# Patient Record
Sex: FEMALE | Race: WHITE | NOT HISPANIC OR LATINO
[De-identification: names, ages, dates, MRNs, and addresses within clinical notes are randomized per-mention and may not be internally consistent; named-entity substitution may affect disease eponyms.]

---

## 2018-06-15 PROBLEM — Z00.00 ENCOUNTER FOR PREVENTIVE HEALTH EXAMINATION: Status: ACTIVE | Noted: 2018-06-15

## 2018-07-09 ENCOUNTER — APPOINTMENT (OUTPATIENT)
Dept: POPULATION HEALTH | Facility: CLINIC | Age: 53
End: 2018-07-09
Payer: COMMERCIAL

## 2018-07-09 VITALS
RESPIRATION RATE: 15 BRPM | HEART RATE: 66 BPM | OXYGEN SATURATION: 96 % | SYSTOLIC BLOOD PRESSURE: 126 MMHG | DIASTOLIC BLOOD PRESSURE: 76 MMHG | WEIGHT: 114 LBS

## 2018-07-09 DIAGNOSIS — Z80.3 FAMILY HISTORY OF MALIGNANT NEOPLASM OF BREAST: ICD-10-CM

## 2018-07-09 DIAGNOSIS — Z83.79 FAMILY HISTORY OF OTHER DISEASES OF THE DIGESTIVE SYSTEM: ICD-10-CM

## 2018-07-09 DIAGNOSIS — Z83.518 FAMILY HISTORY OF OTHER SPECIFIED EYE DISORDER: ICD-10-CM

## 2018-07-09 DIAGNOSIS — Z78.9 OTHER SPECIFIED HEALTH STATUS: ICD-10-CM

## 2018-07-09 DIAGNOSIS — G43.909 MIGRAINE, UNSPECIFIED, NOT INTRACTABLE, W/OUT STATUS MIGRAINOSUS: ICD-10-CM

## 2018-07-09 DIAGNOSIS — Z82.61 FAMILY HISTORY OF ARTHRITIS: ICD-10-CM

## 2018-07-09 DIAGNOSIS — K21.9 GASTRO-ESOPHAGEAL REFLUX DISEASE W/OUT ESOPHAGITIS: ICD-10-CM

## 2018-07-09 DIAGNOSIS — Z82.69 FAMILY HISTORY OF OTHER DISEASES OF THE MUSCULOSKELETAL SYSTEM AND CONNECTIVE TISSUE: ICD-10-CM

## 2018-07-09 DIAGNOSIS — J45.20 MILD INTERMITTENT ASTHMA, UNCOMPLICATED: ICD-10-CM

## 2018-07-09 PROCEDURE — 99202 OFFICE O/P NEW SF 15 MIN: CPT

## 2018-07-09 RX ORDER — IBUPROFEN 800 MG/1
TABLET, FILM COATED ORAL
Refills: 0 | Status: ACTIVE | COMMUNITY

## 2018-07-09 RX ORDER — CALCIUM CITRATE/VITAMIN D3 200MG-6.25
TABLET ORAL
Refills: 0 | Status: ACTIVE | COMMUNITY

## 2018-07-09 RX ORDER — UBIDECARENONE/VIT E ACET 100MG-5
CAPSULE ORAL
Refills: 0 | Status: ACTIVE | COMMUNITY

## 2018-07-09 RX ORDER — SUMATRIPTAN SUCCINATE 100 MG/1
TABLET, FILM COATED ORAL
Refills: 0 | Status: ACTIVE | COMMUNITY

## 2018-07-15 ENCOUNTER — TRANSCRIPTION ENCOUNTER (OUTPATIENT)
Age: 53
End: 2018-07-15

## 2018-11-05 ENCOUNTER — APPOINTMENT (OUTPATIENT)
Dept: POPULATION HEALTH | Facility: CLINIC | Age: 53
End: 2018-11-05
Payer: COMMERCIAL

## 2018-11-05 VITALS
BODY MASS INDEX: 22.78 KG/M2 | WEIGHT: 113 LBS | OXYGEN SATURATION: 99 % | HEIGHT: 59 IN | TEMPERATURE: 98 F | DIASTOLIC BLOOD PRESSURE: 62 MMHG | SYSTOLIC BLOOD PRESSURE: 116 MMHG | RESPIRATION RATE: 16 BRPM | HEART RATE: 80 BPM

## 2018-11-05 PROCEDURE — 99212 OFFICE O/P EST SF 10 MIN: CPT

## 2019-02-04 ENCOUNTER — APPOINTMENT (OUTPATIENT)
Dept: POPULATION HEALTH | Facility: CLINIC | Age: 54
End: 2019-02-04
Payer: COMMERCIAL

## 2019-02-04 VITALS
WEIGHT: 118 LBS | HEIGHT: 59 IN | TEMPERATURE: 97.8 F | RESPIRATION RATE: 16 BRPM | OXYGEN SATURATION: 97 % | DIASTOLIC BLOOD PRESSURE: 78 MMHG | HEART RATE: 91 BPM | SYSTOLIC BLOOD PRESSURE: 124 MMHG | BODY MASS INDEX: 23.79 KG/M2

## 2019-02-04 PROCEDURE — 99213 OFFICE O/P EST LOW 20 MIN: CPT

## 2019-02-05 NOTE — HISTORY OF PRESENT ILLNESS
[FreeTextEntry1] : s. patient continues with left hip pain, severe, invovling botht he medial and external areas of her hip. She has difficulty standing for long periods of time and difficulty walking as well. Reports neck pain and numbness in her hands, especially the rigth. Reports some lower back pain as well. reports numbness in her hands, especially the right, 2-3 digits, recently. continues with left leg pain (toe pain) as well. \par patient saw her pmd for the hip who reportedly recommended not to do a revision of the surgery. she was referred for a second opinion and reportedly two additional doctors stated they don't want to go ahead for a second surgery as they are afraid of the results of the surgery. patient had a mri of the lumbar spine that showed worsening spinal stenosis and was referred to a pm & r md who recommended further tests to clarify the origin of her pain. she is undergoing these tests and has to return to the pm & r doctor. all of this has resulted in severe load of stress. patient was put out of work in early january.\par patient had an evaluation for autoimmune diseases, all negative. \par db: approved for disability for one of her insurances.

## 2019-02-05 NOTE — PHYSICAL EXAM
[General Appearance - Alert] : alert [General Appearance - In No Acute Distress] : in no acute distress [General Appearance - Well Nourished] : well nourished [General Appearance - Well Developed] : well developed [FreeTextEntry1] : continues with an abnormal position. asymmetry of lower extremities. spasm of neck/paraspinal areas na of lower back. tinel is positive in both wrists at the ct area, negative at [facility]. phalen's was positive on the left hand to 2-3 fingers at some 30 sec or so.

## 2019-02-05 NOTE — ASSESSMENT
[FreeTextEntry1] : mri lumbar spine 12/19/18: posterior decompression L4-5, small left sided extrusion with caudal migration of L5 verebral dogy that mildly compresses left intrathecal L5 nerve root, moderate to severe b L4 foraminal stenosis and severe L5-S1, extensive solid osseos fusion of posterior elements. \par mri left hip 12/31/18: s/p total hip arthropalsty, minmiial synovial extension, small amoutn of lfuid decompresssing into psoas bursa, decreased in voluem compared to prior study, unchanged tendiosnis of iliopsoas and hip abuductors.\par x ray pelvis and mri c spine 1/17/19: mild thoracolumbar levocuvature, mild retrolisthesis C34-4-5, moderate multilevel dis space, mild multilevel thoracic spondilosys, some degree of thorac spinal stenosis, straigthening of lumbar lordosis with mild retrolisthesis L4-5, advanced L4-5 and L5S2 disc narroiwng, bilateral totla hip arhtoplasties, slightl pelvic tilt. \par progress note dr divya zapata 12/12/18: persistent pain after KENNETH, ilipsoas ivnovled, extremely disabled. recomends spine injection for diagnostic purposes. \par dr. taylor, ortho 1/3/19: pain post KENNETH, ilipsoas involvement. \par dr. casper, ortho, 1/7/19: does not recommend hip revision, recommends workup for spine and posssible injection, possible steroid injectino to left ilipsias\par dr. klaus jones, rehab, 1/9/19: recomends several studies including emg/ncs lower extremties, consider injection to spine, home exercse program, cervacl mri, consider ct-spect and hydro dissection of nerves if peripheral nerve entrapment is noted. \par labs [facility]: ra, uric acid, cotisol, c3, c4, cpk, lyme, c anca, p anca, esr, rpr, anti dna ds, negin direct, sjogren, crp, c peptide, anti scleroderma all normal\par a. persistent chronic pain with multiple musculoskeletal issues involving cervical spine, lumbar spine, both hips, wrists and hands and left foot. musculoskeletal conditions are definitely aggravated by excessive postural demands, awkward postures and tension that patient has to adopt in the course of her work in order to perform her work. 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 accommodations at work resulted in no improvement. patient was finally recommended to go on medical leave of absence by beginning of january in order to allow her to take care of her medical conditions.\par p. at this time i recommended my patient to stop working in her occupation and focus her energies and time in clarifying and solving her medical conditions as clearly continuing working is not only resulting in worsening her medical conditions but is adding a significant load of stress that further impairs her overall medical situation, patient understood, a letter stating temporal total disability was produced. pt will continue to be evaluated as needed. medical forms completed. counseling and support provided.

## 2019-10-23 ENCOUNTER — TRANSCRIPTION ENCOUNTER (OUTPATIENT)
Age: 54
End: 2019-10-23

## 2020-07-27 ENCOUNTER — APPOINTMENT (OUTPATIENT)
Dept: POPULATION HEALTH | Facility: CLINIC | Age: 55
End: 2020-07-27
Payer: COMMERCIAL

## 2020-07-27 VITALS
TEMPERATURE: 97.9 F | HEART RATE: 87 BPM | BODY MASS INDEX: 22.18 KG/M2 | RESPIRATION RATE: 17 BRPM | DIASTOLIC BLOOD PRESSURE: 80 MMHG | HEIGHT: 59 IN | WEIGHT: 110 LBS | SYSTOLIC BLOOD PRESSURE: 112 MMHG | OXYGEN SATURATION: 97 %

## 2020-07-27 DIAGNOSIS — M25.562 PAIN IN RIGHT KNEE: ICD-10-CM

## 2020-07-27 DIAGNOSIS — M70.72 OTHER BURSITIS OF HIP, LEFT HIP: ICD-10-CM

## 2020-07-27 DIAGNOSIS — M25.561 PAIN IN RIGHT KNEE: ICD-10-CM

## 2020-07-27 DIAGNOSIS — G89.29 LOW BACK PAIN: ICD-10-CM

## 2020-07-27 DIAGNOSIS — M41.127 ADOLESCENT IDIOPATHIC SCOLIOSIS, LUMBOSACRAL REGION: ICD-10-CM

## 2020-07-27 DIAGNOSIS — M50.90 CERVICAL DISC DISORDER, UNSPECIFIED, UNSPECIFIED CERVICAL REGION: ICD-10-CM

## 2020-07-27 DIAGNOSIS — M54.5 LOW BACK PAIN: ICD-10-CM

## 2020-07-27 DIAGNOSIS — M54.2 CERVICALGIA: ICD-10-CM

## 2020-07-27 DIAGNOSIS — G89.29 PAIN IN RIGHT KNEE: ICD-10-CM

## 2020-07-27 PROCEDURE — 99213 OFFICE O/P EST LOW 20 MIN: CPT

## 2020-07-28 RX ORDER — DICLOFENAC SODIUM 10 MG/G
1 GEL TOPICAL
Qty: 100 | Refills: 0 | Status: ACTIVE | COMMUNITY
Start: 2020-01-27

## 2020-07-28 RX ORDER — AMOXICILLIN 500 MG/1
500 TABLET, FILM COATED ORAL
Qty: 4 | Refills: 0 | Status: COMPLETED | COMMUNITY
Start: 2020-02-25

## 2020-07-28 RX ORDER — GENTAMICIN SULFATE 3 MG/ML
0.3 SOLUTION OPHTHALMIC
Qty: 5 | Refills: 0 | Status: COMPLETED | COMMUNITY
Start: 2020-02-25

## 2020-07-28 NOTE — PHYSICAL EXAM
[General Appearance - In No Acute Distress] : in no acute distress [General Appearance - Alert] : alert [General Appearance - Well Developed] : well developed [General Appearance - Well Nourished] : well nourished [FreeTextEntry1] : patient has an abnormal body position with increased kyphosis of her neck. there is asymmetry of lower extremities which results in misalignment of her hips and knees when standing. there is spasm of the neck/ paraspinal areas. there is overall good range of motion of the left hip but there is tenderness especially on hip flexion and rotation. there is tenderness to palpation especially at left hip trochanter area. there is limitation to rom of the lower back due to prior surgery with loss of lumbar lordosis. there is spasm of the paraspinal muscles of the lower back.

## 2020-07-28 NOTE — ASSESSMENT
[FreeTextEntry1] : multiple medical records reviewed, notes from dr jones 3-19 and 7-19, dr mac 3-19, 4-19, 6-19, with transforaminal injection 3-19, dr zain zapata 3-19 and 4-20 with operative report 11-19, dr hicks 6-20 with note of injections undr u/s guidance\par emg lower extremities 1-2019 is essentially normal\par a. continues with multiple musculoskeletal injuries and chronic musculoskeletal pain. conditions involve cervical spine, lumbar spine, both hips, upper extremities and left foot. most probably her conditions are multifactorial and are the consequence of cumulation of years of excessive muscle strain due to awkward positions and static positions sitting on top of multiple musculoskeletal surgeries with alteration of body mechanics. 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 given the chronicity of the conditions and the lack of response to multiple medical treatments, i think at this time that her conditions are chronic and permanent and have not significantly improved despite multiple medical treatment. her prognosis is guarded. \par p. 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 as stated, my patient cannot tolerate staying the same position for more than 20 min at a time at most as she develops pain which results in added anxiety, stress and difficulty concentrating. therefore, she does not have the ability to fulfill the requirements of a job, job demands and productivity. pt will continue to be evaluated as needed. medical forms completed. counseling and support provided. \par

## 2020-07-28 NOTE — HISTORY OF PRESENT ILLNESS
[FreeTextEntry1] : s. patient continues with multiple musculoskeletal symptoms. her most important site is the left hip where she continues with severe pain, involving both the medial and external areas of her hip, radiating down her left lower extremity. pain presents basically all the time but varies in intensity during different days and at different times of day, sometimes associated with activity, others spontaneous. patient notices pain in her lower back and at times pain in her right lower extremity as well. at times she cannot walk more than a tenth of a mile without stopping because of pain. she cannot sit for periods of over 20 min or so due to coccygeal pain and pain radiated to her left lower extremity. patient continues with neck pain as well, radiated to both upper extremities but mostly to the right, and variable as well. pain is described as dull and constant at the base of the neck and sometimes present with numbness in her hands. numbness in her left pinky finger is better. continues with left leg and toe pain as well. \par since her previous visit, patient had a second left hip surgery. this resulted in better stability of her prosthesis but did not result in improvement of her hip and thigh pain. she then underwent several procedures, including trigger point injections and other specialized procedures, non of which has resulted in alleviation of her symptoms. has had courses of physical therapy which do not fully relieve her symptoms.\par in addition, patient has been having migraine headaches.\par patient is unable to fully complete activities of daily living at home. chores like laundry, cooking and taking care of the home generally require the help of her children or her . she needs to take several breaks during the day in order to rest for short periods of time to be able to return to completing a task in which she was involved previously.\par patient continues to report significant stress and anxiety related to her overall situation. she describes that at times she has difficulty concentrating, especially when dealing with pain, and notes difficulty completing tasks and following instructions especially tasks that require involvement for periods of time. \par db: is having a review of her disability policy.

## 2021-01-25 ENCOUNTER — NON-APPOINTMENT (OUTPATIENT)
Age: 56
End: 2021-01-25

## 2021-06-29 ENCOUNTER — NON-APPOINTMENT (OUTPATIENT)
Age: 56
End: 2021-06-29

## 2021-06-29 ENCOUNTER — APPOINTMENT (OUTPATIENT)
Dept: OPHTHALMOLOGY | Facility: CLINIC | Age: 56
End: 2021-06-29
Payer: COMMERCIAL

## 2021-06-29 PROCEDURE — 92004 COMPRE OPH EXAM NEW PT 1/>: CPT

## 2021-06-29 PROCEDURE — 99072 ADDL SUPL MATRL&STAF TM PHE: CPT

## 2022-05-31 ENCOUNTER — NON-APPOINTMENT (OUTPATIENT)
Age: 57
End: 2022-05-31

## 2022-06-02 ENCOUNTER — APPOINTMENT (OUTPATIENT)
Dept: OPHTHALMOLOGY | Facility: CLINIC | Age: 57
End: 2022-06-02
Payer: MEDICARE

## 2022-06-02 ENCOUNTER — NON-APPOINTMENT (OUTPATIENT)
Age: 57
End: 2022-06-02

## 2022-06-02 PROCEDURE — 92014 COMPRE OPH EXAM EST PT 1/>: CPT

## 2023-06-01 ENCOUNTER — NON-APPOINTMENT (OUTPATIENT)
Age: 58
End: 2023-06-01

## 2023-06-08 ENCOUNTER — APPOINTMENT (OUTPATIENT)
Dept: OPHTHALMOLOGY | Facility: CLINIC | Age: 58
End: 2023-06-08
Payer: MEDICARE

## 2023-06-08 ENCOUNTER — NON-APPOINTMENT (OUTPATIENT)
Age: 58
End: 2023-06-08

## 2023-06-08 PROCEDURE — 92014 COMPRE OPH EXAM EST PT 1/>: CPT

## 2024-06-13 ENCOUNTER — APPOINTMENT (OUTPATIENT)
Dept: OPHTHALMOLOGY | Facility: CLINIC | Age: 59
End: 2024-06-13
Payer: MEDICARE

## 2024-06-13 ENCOUNTER — NON-APPOINTMENT (OUTPATIENT)
Age: 59
End: 2024-06-13

## 2024-06-13 PROCEDURE — 92014 COMPRE OPH EXAM EST PT 1/>: CPT

## 2025-06-17 ENCOUNTER — NON-APPOINTMENT (OUTPATIENT)
Age: 60
End: 2025-06-17

## 2025-06-19 ENCOUNTER — NON-APPOINTMENT (OUTPATIENT)
Age: 60
End: 2025-06-19

## 2025-06-19 ENCOUNTER — APPOINTMENT (OUTPATIENT)
Dept: OPHTHALMOLOGY | Facility: CLINIC | Age: 60
End: 2025-06-19
Payer: MEDICARE

## 2025-06-19 PROCEDURE — 92014 COMPRE OPH EXAM EST PT 1/>: CPT
